# Patient Record
Sex: FEMALE | Race: BLACK OR AFRICAN AMERICAN | NOT HISPANIC OR LATINO | Employment: STUDENT | URBAN - METROPOLITAN AREA
[De-identification: names, ages, dates, MRNs, and addresses within clinical notes are randomized per-mention and may not be internally consistent; named-entity substitution may affect disease eponyms.]

---
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
## 2025-04-22 ENCOUNTER — OFFICE VISIT (OUTPATIENT)
Dept: OBSTETRICS AND GYNECOLOGY | Facility: CLINIC | Age: 20
End: 2025-04-22
Payer: MEDICAID

## 2025-04-22 VITALS
HEIGHT: 66 IN | WEIGHT: 147.69 LBS | BODY MASS INDEX: 23.74 KG/M2 | SYSTOLIC BLOOD PRESSURE: 103 MMHG | DIASTOLIC BLOOD PRESSURE: 78 MMHG

## 2025-04-22 DIAGNOSIS — N83.209 RUPTURE OF OVARIAN CYST: Primary | ICD-10-CM

## 2025-04-22 PROCEDURE — 3078F DIAST BP <80 MM HG: CPT | Mod: CPTII,,, | Performed by: STUDENT IN AN ORGANIZED HEALTH CARE EDUCATION/TRAINING PROGRAM

## 2025-04-22 PROCEDURE — 99213 OFFICE O/P EST LOW 20 MIN: CPT | Mod: PBBFAC | Performed by: STUDENT IN AN ORGANIZED HEALTH CARE EDUCATION/TRAINING PROGRAM

## 2025-04-22 PROCEDURE — 3074F SYST BP LT 130 MM HG: CPT | Mod: CPTII,,, | Performed by: STUDENT IN AN ORGANIZED HEALTH CARE EDUCATION/TRAINING PROGRAM

## 2025-04-22 PROCEDURE — 1159F MED LIST DOCD IN RCRD: CPT | Mod: CPTII,,, | Performed by: STUDENT IN AN ORGANIZED HEALTH CARE EDUCATION/TRAINING PROGRAM

## 2025-04-22 PROCEDURE — 3008F BODY MASS INDEX DOCD: CPT | Mod: CPTII,,, | Performed by: STUDENT IN AN ORGANIZED HEALTH CARE EDUCATION/TRAINING PROGRAM

## 2025-04-22 PROCEDURE — 99213 OFFICE O/P EST LOW 20 MIN: CPT | Mod: S$PBB,,, | Performed by: STUDENT IN AN ORGANIZED HEALTH CARE EDUCATION/TRAINING PROGRAM

## 2025-04-22 PROCEDURE — 99999 PR PBB SHADOW E&M-EST. PATIENT-LVL III: CPT | Mod: PBBFAC,,, | Performed by: STUDENT IN AN ORGANIZED HEALTH CARE EDUCATION/TRAINING PROGRAM

## 2025-04-22 NOTE — PROGRESS NOTES
Gynecology    SUBJECTIVE:     Chief Complaint: Follow-up       History of Present Illness:  Laina Dumont is a 18 yo G0 here today for ER follow up. She went to the ED on 04/07 with acute, severe pelvic pain. A CT A/P was done which demonstrated: 3.6 cm peripherally enhancing left adnexal cystic lesion suggestive of hemorrhagic corpus luteum associated with moderate amount of pelvic free fluid measuring simple fluid density that is above the volume suspected for physiologic pelvic free fluid. Cyst rupture cannot be excluded.  Her pain was controlled with one dose of toradol and she was discharged home in stable condition.    Today, she reports that she is doing well. She states she occasionally feels pelvic pain but it typically only lasts a few seconds before resolving. She has no other complaints.   She is currently using OCPs     Review of Systems:  Review of Systems   Constitutional:  Negative for chills and fever.   Respiratory:  Negative for shortness of breath.    Cardiovascular:  Negative for chest pain.   Gastrointestinal:  Negative for abdominal pain, nausea and vomiting.   Endocrine: Negative for hot flashes.   Genitourinary:  Negative for menstrual problem.   Integumentary:  Negative for breast mass, nipple discharge and breast skin changes.   Neurological:  Negative for headaches.   Hematological:  Does not bruise/bleed easily.   Psychiatric/Behavioral:  Negative for depression.    Breast: Negative for mass, mastodynia, nipple discharge and skin changes       OBJECTIVE:     Physical Exam:  Physical Exam  Constitutional:       Appearance: Normal appearance.   HENT:      Head: Normocephalic and atraumatic.   Eyes:      Conjunctiva/sclera: Conjunctivae normal.      Pupils: Pupils are equal, round, and reactive to light.   Cardiovascular:      Rate and Rhythm: Normal rate and regular rhythm.   Pulmonary:      Effort: Pulmonary effort is normal. No respiratory distress.   Abdominal:      General: Abdomen is  flat. There is no distension.      Palpations: Abdomen is soft.      Tenderness: There is no abdominal tenderness.   Genitourinary:     Comments: declines  Musculoskeletal:         General: Normal range of motion.      Cervical back: Normal range of motion.   Neurological:      Mental Status: She is alert.   Psychiatric:         Mood and Affect: Mood normal.         Thought Content: Thought content normal.       ASSESSMENT:       ICD-10-CM ICD-9-CM    1. Rupture of ovarian cyst  N83.209 620.2 US Pelvis Comp with Transvag NON-OB (xpd             Plan:      Laina was seen today for follow-up.    Diagnoses and all orders for this visit:    Rupture of ovarian cyst  -    Will repeat imaging with ultrasound   -    Continue OCP use. Can transition to continuous use if imaging demonstrates ovarian cysts   -    US Pelvis Comp with Transvag NON-OB (xpd; Future        Orders Placed This Encounter   Procedures    US Pelvis Comp with Transvag NON-OB (xpd       Adama Hendrix    Answers submitted by the patient for this visit:  Gynecologic Exam Questionnaire  (Submitted on 4/22/2025)  Chief Complaint: Gynecologic exam  genital itching: No  genital lesions: No  genital odor: No  genital rash: No  missed menses: No  pelvic pain: Yes  vaginal bleeding: No  vaginal discharge: No  Chronicity: new  Onset: more than 1 month ago  Frequency: intermittently  Progression since onset: gradually improving  Pain severity: mild  Affected side: both  Pregnant now?: No  abdominal pain: No  anorexia: No  back pain: Yes  chills: No  constipation: Yes  diarrhea: Yes  discolored urine: No  dysuria: No  fever: No  flank pain: No  frequency: No  headaches: No  hematuria: No  nausea: No  painful intercourse: Yes  rash: No  urgency: Yes  vomiting: No  Vaginal bleeding: no bleeding  Passing clots?: No  Passing tissue?: No  treatments tried: acetaminophen  Improvement on treatment: significant  Sexual activity: sexually active  Partner with STD  symptoms: no  Birth control: oral contraceptives  Menstrual history: regular  STD: No  abdominal surgery: No   section: No  Ectopic pregnancy: No  Endometriosis: No  herpes simplex: No  gynecological surgery: No  menorrhagia: No  metrorrhagia: No  miscarriage: No  ovarian cysts: Yes  perineal abscess: No  PID: No  terminated pregnancy: No  vaginosis: No

## 2025-05-19 ENCOUNTER — OFFICE VISIT (OUTPATIENT)
Dept: PRIMARY CARE CLINIC | Facility: CLINIC | Age: 20
End: 2025-05-19
Payer: MEDICAID

## 2025-05-19 VITALS
HEIGHT: 66 IN | SYSTOLIC BLOOD PRESSURE: 130 MMHG | DIASTOLIC BLOOD PRESSURE: 80 MMHG | BODY MASS INDEX: 23.42 KG/M2 | WEIGHT: 145.75 LBS | RESPIRATION RATE: 16 BRPM | OXYGEN SATURATION: 98 % | HEART RATE: 74 BPM

## 2025-05-19 DIAGNOSIS — G89.29 CHRONIC MIDLINE LOW BACK PAIN WITH BILATERAL SCIATICA: ICD-10-CM

## 2025-05-19 DIAGNOSIS — F32.1 CURRENT MODERATE EPISODE OF MAJOR DEPRESSIVE DISORDER WITHOUT PRIOR EPISODE: ICD-10-CM

## 2025-05-19 DIAGNOSIS — M54.41 CHRONIC MIDLINE LOW BACK PAIN WITH BILATERAL SCIATICA: ICD-10-CM

## 2025-05-19 DIAGNOSIS — M79.10 MYALGIA: ICD-10-CM

## 2025-05-19 DIAGNOSIS — Z00.00 WELL ADULT EXAM: Primary | ICD-10-CM

## 2025-05-19 DIAGNOSIS — G89.29 CHRONIC MIDLINE THORACIC BACK PAIN: ICD-10-CM

## 2025-05-19 DIAGNOSIS — M54.42 CHRONIC MIDLINE LOW BACK PAIN WITH BILATERAL SCIATICA: ICD-10-CM

## 2025-05-19 DIAGNOSIS — M54.6 CHRONIC MIDLINE THORACIC BACK PAIN: ICD-10-CM

## 2025-05-19 DIAGNOSIS — Z11.3 SCREEN FOR STD (SEXUALLY TRANSMITTED DISEASE): ICD-10-CM

## 2025-05-19 PROCEDURE — 1160F RVW MEDS BY RX/DR IN RCRD: CPT | Mod: CPTII,,, | Performed by: FAMILY MEDICINE

## 2025-05-19 PROCEDURE — 3075F SYST BP GE 130 - 139MM HG: CPT | Mod: CPTII,,, | Performed by: FAMILY MEDICINE

## 2025-05-19 PROCEDURE — 99385 PREV VISIT NEW AGE 18-39: CPT | Mod: S$PBB,1E,GY, | Performed by: FAMILY MEDICINE

## 2025-05-19 PROCEDURE — 99214 OFFICE O/P EST MOD 30 MIN: CPT | Mod: PBBFAC,PN | Performed by: FAMILY MEDICINE

## 2025-05-19 PROCEDURE — 1159F MED LIST DOCD IN RCRD: CPT | Mod: CPTII,,, | Performed by: FAMILY MEDICINE

## 2025-05-19 PROCEDURE — 3079F DIAST BP 80-89 MM HG: CPT | Mod: CPTII,,, | Performed by: FAMILY MEDICINE

## 2025-05-19 PROCEDURE — 3008F BODY MASS INDEX DOCD: CPT | Mod: CPTII,,, | Performed by: FAMILY MEDICINE

## 2025-05-19 PROCEDURE — 99999 PR PBB SHADOW E&M-EST. PATIENT-LVL IV: CPT | Mod: PBBFAC,,, | Performed by: FAMILY MEDICINE

## 2025-05-19 PROCEDURE — 99213 OFFICE O/P EST LOW 20 MIN: CPT | Mod: 25,S$PBB,, | Performed by: FAMILY MEDICINE

## 2025-05-19 NOTE — PROGRESS NOTES
Subjective:       Patient ID: Laina Dumont is a 19 y.o. female.    Chief Complaint: Establish Care    History of Present Illness    CHIEF COMPLAINT:  Patient presents today for regular primary checkup and back pain.    BACK PAIN:  She reports chronic back pain since high school, initially starting during powerlifting. She experiences two distinct types of pain: upper back pain at the bra line described as pinching sensation, aggravated by prolonged bra wear and lasting about an hour; and lower back pain with prolonged standing that radiates down both legs, which is more bothersome and persists throughout the day once triggered. No back imaging has been performed.    CHRONIC LEG PAIN:  She reports leg pain since childhood, initially diagnosed as growing pains that were expected to resolve by age 7. Pain occurs 1-2 times weekly, characterized by muscle tightening and spasms, affecting either the entire leg, just the thigh, one leg, or both legs. She uses ibuprofen for relief.    MEDICAL HISTORY:  She has a recent history of an ovarian cyst. During freshman year of high school, she had severe stomach issues requiring multiple hospitalizations, though no definitive diagnosis was established despite considerations of constipation and gallstones. In sophomore year, she was diagnosed with vitamin B deficiency and sustained a bruised rib.    MENTAL HEALTH:  She reports intermittent depression symptoms since sophomore year of high school, including feeling unlike herself and inability to experience positive emotions despite positive circumstances. She denies suicidal thoughts.    SOCIAL HISTORY:  She is a college student at [employer] and works as a . She exercises twice weekly, performing cardio activities including running and stair master. She reports occasional alcohol consumption of 1-2 drinks and weekend marijuana use. She denies vaping or cigarette use.      ROS:  Cardiovascular: +orthostatic  symptoms  Musculoskeletal: +back pain, +shooting pain sensation, +nerve pain, +muscle cramps          Review of patient's allergies indicates:  No Known Allergies    Current Medications[1]    History reviewed. No pertinent past medical history. History reviewed. No pertinent surgical history.   Social History     Socioeconomic History    Marital status: Single   Tobacco Use    Smoking status: Never     Passive exposure: Never    Smokeless tobacco: Never   Substance and Sexual Activity    Alcohol use: No    Drug use: No    Sexual activity: Never   Social History Narrative    Lives at home with 2 sisters, mom and step dad.    1 dog    No smokers    11th grade at [employer] (2022-23 school year)     Social Drivers of Health     Financial Resource Strain: Low Risk  (4/22/2025)    Overall Financial Resource Strain (CARDIA)     Difficulty of Paying Living Expenses: Not very hard   Food Insecurity: No Food Insecurity (4/22/2025)    Hunger Vital Sign     Worried About Running Out of Food in the Last Year: Never true     Ran Out of Food in the Last Year: Never true   Transportation Needs: No Transportation Needs (4/22/2025)    PRAPARE - Transportation     Lack of Transportation (Medical): No     Lack of Transportation (Non-Medical): No   Physical Activity: Sufficiently Active (4/22/2025)    Exercise Vital Sign     Days of Exercise per Week: 5 days     Minutes of Exercise per Session: 60 min   Stress: No Stress Concern Present (4/22/2025)    [place] [employer] - Occupational Stress Questionnaire     Feeling of Stress : Only a little   Housing Stability: Low Risk  (4/22/2025)    Housing Stability Vital Sign     Unable to Pay for Housing in the Last Year: No     Number of Times Moved in the Last Year: 0     Homeless in the Last Year: No      Family History   Problem Relation Name Age of Onset    Thyroid disease Maternal Grandmother      Hypertension Maternal Grandfather      Stroke Neg Hx       Strabismus Neg Hx      Retinal detachment Neg Hx      Macular degeneration Neg Hx      Glaucoma Neg Hx      Diabetes Neg Hx      Blindness Neg Hx      Amblyopia Neg Hx      Arrhythmia Neg Hx      Cardiomyopathy Neg Hx      Congenital heart disease Neg Hx      Early death Neg Hx      Heart attacks under age 50 Neg Hx      Pacemaker/defibrilator Neg Hx       Review of Systems    Objective:      Physical Exam    General: No acute distress. Well-developed. Well-nourished.  Eyes: EOMI. Sclerae anicteric.  HENT: Normocephalic. Atraumatic. Nares patent. Moist oral mucosa.  Cardiovascular: Regular rate. Regular rhythm. No murmurs. No rubs. No gallops. Normal S1, S2.  Respiratory: Normal respiratory effort. Clear to auscultation bilaterally. No rales. No rhonchi. No wheezing.  Musculoskeletal: No  obvious deformity.  No scoliosis.  No tenderness of the lower thoracic or lumbar spine  Extremities: No lower extremity edema.  Neurological: Alert & oriented x3. No slurred speech. Normal gait.  Psychiatric: Normal mood. Normal affect. Good insight. Good judgment.  Skin: Warm. Dry.  Areas of hyperpigmentation noted on the trunk         Assessment:       1. Well adult exam    2. Screen for STD (sexually transmitted disease)    3. Current moderate episode of major depressive disorder without prior episode    4. Chronic midline thoracic back pain    5. Chronic midline low back pain with bilateral sciatica    6. Myalgia        Plan:         Laina was seen today for establish care.    Diagnoses and all orders for this visit:    Well adult exam  Care gaps address.  Encouraged exercise  -     Hepatitis C Antibody; Future  -     HIV 1/2 Ag/Ab (4th Gen); Future    Screen for STD (sexually transmitted disease)  -     Treponema Pallidium Antibodies IgG, IgM; Future    Current moderate episode of major depressive disorder without prior episode  Patient reports depressed mood since summer urine high school.  She is interested in counseling.   Denies suicide ideation.  -     Ambulatory referral/consult to Primary Care Behavioral Health (Non-Opioids); Future    Chronic midline thoracic back pain  Patient reports onset of back pain following power weight lifting high school  -     X-Ray Thoracic Spine AP Lateral; Future    Chronic midline low back pain with bilateral sciatica  -     X-Ray Lumbar Spine 5 View; Future    Myalgia  -     CK; Future  -     Magnesium; Future        Assessment & Plan    MEDICATIONS:  - Recommend treatment for apparent tinea versicolor with OTC antifungal shampoo (Selsun Blue) applied to affected areas of skin at night and wash off in the morning, continue for several weeks.    ORDERS:  - Ordered lumbosacral XR to evaluate for compression fracture given history of powerlifting and current lower back pain radiating to legs.  - Ordered labs including magnesium level to check muscle-related markers, given reported muscle cramps and leg pain.  - Ordered screening for sexually transmitted diseases including HIV, hepatitis, and syphilis.    REFERRALS:  - Referred to behavioral health for counseling.          Lola Cox MD        This note was generated with the assistance of ambient listening technology. Verbal consent was obtained by the patient and accompanying visitor(s) for the recording of patient appointment to facilitate this note. I attest to having reviewed and edited the generated note for accuracy, though some syntax or spelling errors may persist. Please contact the author of this note for any clarification.            [1]   Current Outpatient Medications:     norethindrone-ethinyl estradiol (NECON) 0.5-35 mg-mcg per tablet, Take 1 tablet by mouth once daily., Disp: 90 tablet, Rfl: 3

## 2025-05-20 ENCOUNTER — HOSPITAL ENCOUNTER (OUTPATIENT)
Dept: RADIOLOGY | Facility: HOSPITAL | Age: 20
Discharge: HOME | End: 2025-05-20
Attending: FAMILY MEDICINE
Payer: MEDICAID

## 2025-05-20 DIAGNOSIS — M54.42 CHRONIC MIDLINE LOW BACK PAIN WITH BILATERAL SCIATICA: ICD-10-CM

## 2025-05-20 DIAGNOSIS — M54.6 CHRONIC MIDLINE THORACIC BACK PAIN: ICD-10-CM

## 2025-05-20 DIAGNOSIS — M54.41 CHRONIC MIDLINE LOW BACK PAIN WITH BILATERAL SCIATICA: ICD-10-CM

## 2025-05-20 DIAGNOSIS — G89.29 CHRONIC MIDLINE THORACIC BACK PAIN: ICD-10-CM

## 2025-05-20 DIAGNOSIS — G89.29 CHRONIC MIDLINE LOW BACK PAIN WITH BILATERAL SCIATICA: ICD-10-CM

## 2025-05-20 PROCEDURE — 72070 X-RAY EXAM THORAC SPINE 2VWS: CPT | Mod: TC,FY,PN

## 2025-05-20 PROCEDURE — 72110 X-RAY EXAM L-2 SPINE 4/>VWS: CPT | Mod: TC,FY,PN

## 2025-05-20 PROCEDURE — 72070 X-RAY EXAM THORAC SPINE 2VWS: CPT | Mod: 26,,, | Performed by: RADIOLOGY

## 2025-05-20 PROCEDURE — 72110 X-RAY EXAM L-2 SPINE 4/>VWS: CPT | Mod: 26,,, | Performed by: RADIOLOGY

## 2025-05-26 ENCOUNTER — TELEPHONE (OUTPATIENT)
Dept: PRIMARY CARE CLINIC | Facility: CLINIC | Age: 20
End: 2025-05-26
Payer: MEDICAID

## 2025-05-26 ENCOUNTER — RESULTS FOLLOW-UP (OUTPATIENT)
Dept: PRIMARY CARE CLINIC | Facility: CLINIC | Age: 20
End: 2025-05-26

## 2025-05-26 DIAGNOSIS — M54.42 CHRONIC MIDLINE LOW BACK PAIN WITH BILATERAL SCIATICA: ICD-10-CM

## 2025-05-26 DIAGNOSIS — G89.29 CHRONIC MIDLINE THORACIC BACK PAIN: Primary | ICD-10-CM

## 2025-05-26 DIAGNOSIS — G89.29 CHRONIC MIDLINE LOW BACK PAIN WITH BILATERAL SCIATICA: ICD-10-CM

## 2025-05-26 DIAGNOSIS — M54.6 CHRONIC MIDLINE THORACIC BACK PAIN: Primary | ICD-10-CM

## 2025-05-26 DIAGNOSIS — M54.41 CHRONIC MIDLINE LOW BACK PAIN WITH BILATERAL SCIATICA: ICD-10-CM

## 2025-06-02 ENCOUNTER — RESULTS FOLLOW-UP (OUTPATIENT)
Dept: OBSTETRICS AND GYNECOLOGY | Facility: HOSPITAL | Age: 20
End: 2025-06-02

## 2025-06-02 ENCOUNTER — HOSPITAL ENCOUNTER (OUTPATIENT)
Dept: RADIOLOGY | Facility: HOSPITAL | Age: 20
Discharge: HOME | End: 2025-06-02
Attending: STUDENT IN AN ORGANIZED HEALTH CARE EDUCATION/TRAINING PROGRAM
Payer: MEDICAID

## 2025-06-02 DIAGNOSIS — N83.209 RUPTURE OF OVARIAN CYST: ICD-10-CM

## 2025-06-02 PROCEDURE — 76830 TRANSVAGINAL US NON-OB: CPT | Mod: 26,,, | Performed by: RADIOLOGY

## 2025-06-02 PROCEDURE — 76856 US EXAM PELVIC COMPLETE: CPT | Mod: 26,,, | Performed by: RADIOLOGY

## 2025-06-02 PROCEDURE — 76830 TRANSVAGINAL US NON-OB: CPT | Mod: TC

## 2025-06-16 ENCOUNTER — CLINICAL SUPPORT (OUTPATIENT)
Dept: REHABILITATION | Facility: HOSPITAL | Age: 20
End: 2025-06-16
Payer: MEDICAID

## 2025-06-16 DIAGNOSIS — R29.898 WEAKNESS OF BOTH HIPS: Primary | ICD-10-CM

## 2025-06-16 PROCEDURE — 97110 THERAPEUTIC EXERCISES: CPT

## 2025-06-16 PROCEDURE — 97161 PT EVAL LOW COMPLEX 20 MIN: CPT

## 2025-06-16 NOTE — PROGRESS NOTES
Outpatient Rehab    Physical Therapy Evaluation    Patient Name: Laina Dumont  MRN: [id number]  YOB: 2005  Encounter Date: 6/16/2025    Therapy Diagnosis:   Encounter Diagnosis   Name Primary?    Weakness of both hips Yes     Physician: Lola Cox MD    Physician Orders: Eval and Treat  Medical Diagnosis: Chronic midline thoracic back pain  Chronic midline low back pain with bilateral sciatica  Surgical Diagnosis: Not applicable for this Episode   Surgical Date: Not applicable for this Episode  Days Since Last Surgery: Not applicable for this Episode    Visit # / Visits Authorized:  1 / 1  Insurance Authorization Period: 5/26/2025 to 5/26/2026  Date of Evaluation: 6/16/2025  Plan of Care Certification: 6/16/2025 to 8/15/2025    Time In: 1405   Time Out: 1455  Total Time (in minutes): 50   Total Billable Time (in minutes): 50    Intake Outcome Measure for FOTO Survey    Therapist reviewed FOTO scores for Laina Dumont on 6/16/2025.   FOTO report - see Media section or FOTO account episode details.     Intake Score: 59%    Precautions:       Subjective   History of Present Illness  Laina is a 19 y.o. female who reports to physical therapy with a chief concern of low back pain and mid back pain. The pain initially started when she did powerlifting in high school. She reports the back pain is intermittent. She feels it most with prolonged sitting and standing. She occasionally has pain radiating down her legs.      Pain     Patient reports a current pain level of 0/10. Pain at best is reported as 0/10. Pain at worst is reported as 8/10.   Location: mid and low back  Pain Qualities: Aching  Pain-Aggravating Factors: Standing, Sitting       Past Medical History/Physical Systems Review:   Laina Dumont  has no past medical history on file.    Laina Dumont  has no past surgical history on file.    Laina has a current medication list which includes the following prescription(s): norethindrone-ethinyl  estradiol.    Review of patient's allergies indicates:  No Known Allergies     Objective      Posture: sitting: protracted shoulders, slouched posture     Lumbar Range of Motion:    % Limitation Pain   Flexion No loss           Extension No loss           Left Side Bending No loss         Right Side Bending No loss         Left rotation   No loss         Right Rotation   No loss            Hip Range of Motion:   Right  Left   Hip Extension 30 deg 30 deg   Hip External Rotation (hip flexed to 90) 50 deg 50 deg   Hip Internal Rotation (hip flexed to 90) 45 deg 45 deg   Hip Flexion 120 deg 120 deg     Lower Extremity Strength  Right LE  Left LE  Goal   Knee extension: 5/5 Knee extension: 5/5 5/5   Knee flexion: 5/5 Knee flexion: 5/5 5/5   Hip flexion: 4/5 Hip flexion: 4+/5 5/5   Hip extension:  4-/5 Hip extension: 4-/5 5/5   Hip abduction: 4-/5 Hip abduction: 4-/5 5/5   Hip IR: 4+/5 Hip IR: 4+/5 5/5   Hip ER: 4/5 Hip ER: 4/5 5/5     DTR:   Right Left Comment   Patellar (L3-4) 2+ 2+    Achilles (S1) 2+ 2+      Neuro Dynamic Testing:    Sciatic nerve:      SLR: R = Negative     L = Negative      Joint Mobility: hypomobile throughout mid thoracic spine with     Sensation: intact bilateral LE      Treatment:     Therapeutic Exercise for 10 minutes:    HEP instruction:  Single leg bridges  Sidelying hip abduction  Bird dog LE only  Wall slide + lift off    Time Entry(in minutes):  PT Evaluation (Low) Time Entry: 40  Therapeutic Exercise Time Entry: 10    Assessment & Plan   Assessment  Laina presents with a condition of Low complexity.           Functional Limitations: Sitting tolerance, Standing tolerance  Impairments: Impaired physical strength    Prognosis: Good  Assessment Details: Laina is a 19 y.o. female referred to outpatient Physical Therapy with a medical diagnosis of Chronic midline thoracic back pain, Chronic midline low back pain with bilateral sciatica. Upon physical assessment, patient demonstrates:  decreased thoracic range of motion, bilateral hip weakness, and poor posture. Patient will benefit from skilled outpatient Physical Therapy to address the deficits stated, provide patient education, and to maximize patient's quality of life.     Plan  From a physical therapy perspective, the patient would benefit from: Skilled Rehab Services    Planned therapy interventions include: Therapeutic exercise, Therapeutic activities, Neuromuscular re-education, and Manual therapy.    Planned modalities to include: Biofeedback, Cryotherapy (cold pack), Electrical stimulation - attended, Electrical stimulation - passive/unattended, and Thermotherapy (hot pack).        Visit Frequency: 1 times Per Week for 8 Weeks.       This plan was discussed with Patient.   Discussion participants: Agreed Upon Plan of Care         The patient's spiritual, cultural, and educational needs were considered, and the patient is agreeable to the plan of care and goals.         Goals:   Active       Long Term Goals       Long Term Goals       Start:  06/23/25    Expected End:  08/15/25       1.     Patient will demonstrate bilateral hip strength 5/5.  2.     Pain Rating at Worst: 1/10 to improve overall Quality of Life.    3.     Patient will meet predicted functional outcome (FOTO) score: 70% functional ability or greater to increase self-perceived functional ability.  4.     Patient will be independent with updated HEP at discharge.               Short Term Goals       Short Term Goals       Start:  06/23/25    Expected End:  07/18/25       1.     Patient will be independent with HEP to supplement therapy in return to maximal function.  2.     Pain rating at Worst: 4/10 or less for improved tolerance with prolonged standing.   3.     Patient will demonstrate bilateral hip strength at least 4+/5.                Mleania Fitzpatrick, PT

## 2025-06-23 ENCOUNTER — CLINICAL SUPPORT (OUTPATIENT)
Dept: REHABILITATION | Facility: HOSPITAL | Age: 20
End: 2025-06-23
Payer: MEDICAID

## 2025-06-23 DIAGNOSIS — R29.898 WEAKNESS OF BOTH HIPS: Primary | ICD-10-CM

## 2025-06-23 PROCEDURE — 97110 THERAPEUTIC EXERCISES: CPT

## 2025-06-23 NOTE — PROGRESS NOTES
Outpatient Rehab    Physical Therapy Visit    Patient Name: Laina Dumont  MRN: [id number]  YOB: 2005  Encounter Date: 6/23/2025    Therapy Diagnosis:   Encounter Diagnosis   Name Primary?    Weakness of both hips Yes     Physician: Lola Cox MD    Physician Orders: Eval and Treat  Medical Diagnosis: Chronic midline thoracic back pain  Chronic midline low back pain with bilateral sciatica  Surgical Diagnosis: Not applicable for this Episode   Surgical Date: Not applicable for this Episode  Days Since Last Surgery: Not applicable for this Episode    Visit # / Visits Authorized:  1 / 10  Insurance Authorization Period: 6/16/2025 to 8/22/2025  Date of Evaluation: 6/16/2025  Plan of Care Certification: 6/16/2025 to 8/15/2025      PT/PTA:     Number of PTA visits since last PT visit:   Time In: 1300   Time Out: 1340  Total Time (in minutes): 40   Total Billable Time (in minutes): 40    FOTO:  Intake Score:  %  Survey Score 2:  %  Survey Score 3:  %    Precautions:       Subjective   Patient reports she wasn't feeling well over the weekend and did a lot of coughing which led to her chest feeling very sore. She also noticed bruising in her chest.         Objective        Treatment:     Therapeutic Exercise for 40 minutes:    Supine PPT with biofeedback cuff 40 mmHg to 50 mmHg 10 sec hold x 10  Supine PPT + BKFO with biofeedback cuff x 10 each  Bridges 3 x 8  Sidelying clamshells 3 x 8 bilateral   Supine SLR 2 x 10 bilateral  Sidelying hip abduction 2 x 10 bilateral     Time Entry(in minutes):  Therapeutic Exercise Time Entry: 40    Assessment & Plan   Assessment:     Patient arrived with reports of chest soreness/discomfort therefore shortened treatment session today. Treatment focused on core stabilization and bilateral hip strengthening. Will progress as tolerated     The patient will continue to benefit from skilled outpatient physical therapy in order to address the deficits listed in the problem  list on the initial evaluation, provide patient and family education, and maximize the patients level of independence in the home and community environments.     The patient's spiritual, cultural, and educational needs were considered, and the patient is agreeable to the plan of care and goals.         Plan:  Continue per established PT plan of care.     Goals:   Active       Long Term Goals       Long Term Goals       Start:  06/23/25    Expected End:  08/15/25       1.     Patient will demonstrate bilateral hip strength 5/5.  2.     Pain Rating at Worst: 1/10 to improve overall Quality of Life.    3.     Patient will meet predicted functional outcome (FOTO) score: 70% functional ability or greater to increase self-perceived functional ability.  4.     Patient will be independent with updated HEP at discharge.               Short Term Goals       Short Term Goals       Start:  06/23/25    Expected End:  07/18/25       1.     Patient will be independent with HEP to supplement therapy in return to maximal function.  2.     Pain rating at Worst: 4/10 or less for improved tolerance with prolonged standing.   3.     Patient will demonstrate bilateral hip strength at least 4+/5.                Melania Fitzpatrick, PT

## 2025-06-30 ENCOUNTER — CLINICAL SUPPORT (OUTPATIENT)
Dept: REHABILITATION | Facility: HOSPITAL | Age: 20
End: 2025-06-30
Payer: MEDICAID

## 2025-06-30 DIAGNOSIS — R29.898 WEAKNESS OF BOTH HIPS: Primary | ICD-10-CM

## 2025-06-30 PROCEDURE — 97110 THERAPEUTIC EXERCISES: CPT | Mod: CQ

## 2025-06-30 PROCEDURE — 97530 THERAPEUTIC ACTIVITIES: CPT | Mod: CQ

## 2025-06-30 NOTE — PROGRESS NOTES
Outpatient Rehab    Physical Therapy Visit    Patient Name: Laina Dumont  MRN: [id number]  YOB: 2005  Encounter Date: 6/30/2025    Therapy Diagnosis:   Encounter Diagnosis   Name Primary?    Weakness of both hips Yes     Physician: Lola Cox MD    Physician Orders: Eval and Treat  Medical Diagnosis: Chronic midline thoracic back pain  Chronic midline low back pain with bilateral sciatica  Surgical Diagnosis: Not applicable for this Episode   Surgical Date: Not applicable for this Episode  Days Since Last Surgery: Not applicable for this Episode    Visit # / Visits Authorized:  2 / 10  Insurance Authorization Period: 6/16/2025 to 8/22/2025  Date of Evaluation: 6/16/2025  Plan of Care Certification: 6/16/2025 to 8/15/2025      PT/PTA: PTA   Number of PTA visits since last PT visit:1    Time In: 1310   Time Out: 1400  Total Time (in minutes): 50   Total Billable Time (in minutes):   50    FOTO:  Intake Score:  %  Survey Score 2:  %  Survey Score 3:  %    Precautions:     Subjective   Patient reports some tightness throughout lumbar spine, however, patient feeling than last treatment session..  Pain reported as 2/10.      Objective    Objective Measures updated at progress report unless specified.      Treatment:   Therapeutic Exercise for 50 minutes:     Supine PPT with biofeedback cuff 40 mmHg to 50 mmHg 10 sec hold x 10  Supine PPT + BKFO with biofeedback cuff x 10 each  Bridges 3 x 8  Sidelying clamshells 3 x 8 bilateral   Supine SLR 2 x 10 bilateral  Sidelying hip abduction 2 x 10 bilateral   Sidelying open books x 15 each side 3 second holds   Seated internal rotation red thera band 2 x 10   Ball roll outs 2 x 10 3 second holds   Seated bilateral piriformis stretching     Therapeutic activity: 10 minutes     Sit to stands 2 x 10   Lateral walkouts red thera band 10 yards 3 laps      Time Entry(in minutes):  Therapeutic Exercise Time Entry: 40    Assessment & Plan   Assessment:   Patient  required verbal cues with sit to stands due to patient with no hip igne throughout task, however, after verbal cues to hinge at her hips patient was able to complete task properly. Patient with some increased discomfort throughout lumbar spine throughout therapeutic exercise with sit to stands and lateral walkouts. Patient was able to perform seated ball roll outs and seated piriformis stretch with decreased discomfort throughout lumbar. Physical Therapist Assistant instructed patient she can perform those two stretches and seated hamstring stretch at home to help decrease discomfort throughout lumbar spine outside of Physical Therapy treatment. Patient verbally agreed and understood.      The patient will continue to benefit from skilled outpatient physical therapy in order to address the deficits listed in the problem list on the initial evaluation, provide patient and family education, and maximize the patients level of independence in the home and community environments.     The patient's spiritual, cultural, and educational needs were considered, and the patient is agreeable to the plan of care and goals.       Plan:   Continue with Physical Therapist Plan of Care.     Goals:   Active       Long Term Goals       Long Term Goals       Start:  06/23/25    Expected End:  08/15/25       1.     Patient will demonstrate bilateral hip strength 5/5.  2.     Pain Rating at Worst: 1/10 to improve overall Quality of Life.    3.     Patient will meet predicted functional outcome (FOTO) score: 70% functional ability or greater to increase self-perceived functional ability.  4.     Patient will be independent with updated HEP at discharge.               Short Term Goals       Short Term Goals       Start:  06/23/25    Expected End:  07/18/25       1.     Patient will be independent with HEP to supplement therapy in return to maximal function.  2.     Pain rating at Worst: 4/10 or less for improved tolerance with prolonged  standing.   3.     Patient will demonstrate bilateral hip strength at least 4+/5.              Og Currie, PTA

## 2025-07-08 ENCOUNTER — PATIENT MESSAGE (OUTPATIENT)
Dept: PRIMARY CARE CLINIC | Facility: CLINIC | Age: 20
End: 2025-07-08
Payer: MEDICAID

## 2025-07-14 ENCOUNTER — CLINICAL SUPPORT (OUTPATIENT)
Dept: REHABILITATION | Facility: HOSPITAL | Age: 20
End: 2025-07-14
Attending: FAMILY MEDICINE
Payer: MEDICAID

## 2025-07-14 DIAGNOSIS — R29.898 WEAKNESS OF BOTH HIPS: Primary | ICD-10-CM

## 2025-07-14 PROCEDURE — 97530 THERAPEUTIC ACTIVITIES: CPT | Mod: CQ

## 2025-07-14 PROCEDURE — 97110 THERAPEUTIC EXERCISES: CPT | Mod: CQ

## 2025-07-14 NOTE — PROGRESS NOTES
Outpatient Rehab    Physical Therapy Visit    Patient Name: Laina Dumont  MRN: [id number]  YOB: 2005  Encounter Date: 7/14/2025    Therapy Diagnosis:   Encounter Diagnosis   Name Primary?    Weakness of both hips Yes     Physician: Lola Cox MD    Physician Orders: Eval and Treat  Medical Diagnosis: Chronic midline thoracic back pain  Chronic midline low back pain with bilateral sciatica  Surgical Diagnosis: Not applicable for this Episode   Surgical Date: Not applicable for this Episode  Days Since Last Surgery: Not applicable for this Episode    Visit # / Visits Authorized:  3 / 10  Insurance Authorization Period: 6/16/2025 to 8/22/2025  Date of Evaluation: 6/16/2025  Plan of Care Certification: 6/16/2025 to 8/15/2025      PT/PTA: PTA   Number of PTA visits since last PT visit:2    Time In: 1300   Time Out: 1400  Total Time (in minutes): 60   Total Billable Time (in minutes):   60    FOTO:  Intake Score:  %  Survey Score 2:  %  Survey Score 3:  %    Precautions:     Subjective   Patient reports having to cancel last treatment session due to pt over doing it at the gym and patient with increased pain, however, patient feeling much better today..  Pain reported as 0/10.      Objective    Objective Measures updated at progress report unless specified.      Treatment:   Therapeutic Exercise for 50 minutes:     Supine PPT with biofeedback cuff 40 mmHg to 50 mmHg 10 sec hold x 10  Supine PPT + BKFO with biofeedback cuff x 10 each  Bridges 3 x 8  Sidelying clamshells 3 x 8 bilateral   Supine SLR 2 x 10 bilateral  Sidelying hip abduction 2 x 10 bilateral   Sidelying open books x 15 each side 3 second holds   Seated internal rotation red thera band 2 x 10   Ball roll outs 2 x 10 3 second holds   Seated bilateral piriformis stretching     Therapeutic activity: 10 minutes     Sit to stands 2 x 10   Lateral walkouts red thera band 10 yards 3 laps      Time Entry(in minutes):  Therapeutic Exercise Time  Entry: 45  Therapeutic Activity: 15    Assessment & Plan   Assessment:   Patient with some discomfort throughout left knee throughout lateral walkouts, however, patient with decreased discomfort with slight knee flexion throughout task. Patient with good core activation and control throughout posterior pelvic tilt with biofeedback cuff. Will continue to progress patient as tolerated.      The patient will continue to benefit from skilled outpatient physical therapy in order to address the deficits listed in the problem list on the initial evaluation, provide patient and family education, and maximize the patients level of independence in the home and community environments.     The patient's spiritual, cultural, and educational needs were considered, and the patient is agreeable to the plan of care and goals.       Plan:   Continue with Physical Therapist Plan of Care.     Goals:   Active       Long Term Goals       Long Term Goals       Start:  06/23/25    Expected End:  08/15/25       1.     Patient will demonstrate bilateral hip strength 5/5.  2.     Pain Rating at Worst: 1/10 to improve overall Quality of Life.    3.     Patient will meet predicted functional outcome (FOTO) score: 70% functional ability or greater to increase self-perceived functional ability.  4.     Patient will be independent with updated HEP at discharge.               Short Term Goals       Short Term Goals       Start:  06/23/25    Expected End:  07/18/25       1.     Patient will be independent with HEP to supplement therapy in return to maximal function.  2.     Pain rating at Worst: 4/10 or less for improved tolerance with prolonged standing.   3.     Patient will demonstrate bilateral hip strength at least 4+/5.                Og Currie PTA

## 2025-08-01 ENCOUNTER — CLINICAL SUPPORT (OUTPATIENT)
Dept: REHABILITATION | Facility: HOSPITAL | Age: 20
End: 2025-08-01
Attending: FAMILY MEDICINE
Payer: MEDICAID

## 2025-08-01 DIAGNOSIS — R29.898 WEAKNESS OF BOTH HIPS: Primary | ICD-10-CM

## 2025-08-01 PROCEDURE — 97110 THERAPEUTIC EXERCISES: CPT

## 2025-08-01 NOTE — PROGRESS NOTES
Outpatient Rehab    Physical Therapy Progress Note    Patient Name: Laina Dumont  MRN: [id number]  YOB: 2005  Encounter Date: 8/1/2025    Therapy Diagnosis:   Encounter Diagnosis   Name Primary?    Weakness of both hips Yes     Physician: Lola Cox MD    Physician Orders: Eval and Treat  Medical Diagnosis: Chronic midline thoracic back pain  Chronic midline low back pain with bilateral sciatica  Surgical Diagnosis: Not applicable for this Episode   Surgical Date: Not applicable for this Episode  Days Since Last Surgery: Not applicable for this Episode    Visit # / Visits Authorized:  5 / 10  Insurance Authorization Period: 6/16/2025 to 8/22/2025  Date of Evaluation: 6/16/2025  Plan of Care Certification: 6/16/2025 to 8/15/2025      PT/PTA:     Number of PTA visits since last PT visit:   Time In: 1000   Time Out: 1050  Total Time (in minutes): 50   Total Billable Time (in minutes): 25    FOTO:  Intake Score (%): 59  Survey Score 2 (%): Not applicable for this Episode  Survey Score 3 (%): Not applicable for this Episode    Precautions:       Subjective   Patient reports she had to cancel her last 2 appointment due to getting called into work. She reports her low back is feeling better, but she still has mild pain..         Objective          Lower Extremity Strength  Right LE   Left LE   Goal   Knee extension: 5/5 Knee extension: 5/5 5/5   Knee flexion: 5/5 Knee flexion: 5/5 5/5   Hip flexion: 5/5 Hip flexion: 5/5 5/5   Hip extension:  4/5 Hip extension: 4/5 5/5   Hip abduction: 4+/5 Hip abduction: 4+/5 5/5   Hip IR: 4+/5 Hip IR: 4+/5 5/5   Hip ER: 4/5 Hip ER: 4/5 5/5      Treatment:     Therapeutic Exercise for 45 minutes    Re-assessment performed     Treadmill 5 minutes   Sidelying open books x 15 each side 3 second holds   Supine PPT with biofeedback cuff 40 mmHg to 50 mmHg 10 sec hold x 10  Supine PPT + BKFO with biofeedback cuff x 10 each  Bridges 3 x 10 red TB  Sidelying clamshells red  TB 3 x 8 bilateral   Sidelying hip abduction 2 x 10 bilateral   Shuttle leg press 50 lbs 3 x 10    Not performed:  Supine SLR 2 x 10 bilateral  Seated internal rotation red thera band 2 x 10   Ball roll outs 2 x 10 3 second holds   Seated bilateral piriformis stretching      Therapeutic activity: 10 minutes      Sit to stands green TB around knees 3 x 10   Lateral walkouts green thera band 10 yards 3 laps     Time Entry(in minutes):       Assessment & Plan   Assessment:     Patient arrives to PT with mild back pain. She reports symptoms have improved since starting PT. She demonstrates improvement in bilateral hip strength, but still has mild deficits in strength. Will progress treatment as tolerated.     The patient will continue to benefit from skilled outpatient physical therapy in order to address the deficits listed in the problem list on the initial evaluation, provide patient and family education, and maximize the patients level of independence in the home and community environments.     The patient's spiritual, cultural, and educational needs were considered, and the patient is agreeable to the plan of care and goals.         Plan:  Continue per established PT plan of care.       Goals:   Active       Long Term Goals       Long Term Goals       Start:  06/23/25    Expected End:  08/15/25       1.     Patient will demonstrate bilateral hip strength 5/5.  2.     Pain Rating at Worst: 1/10 to improve overall Quality of Life.    3.     Patient will meet predicted functional outcome (FOTO) score: 70% functional ability or greater to increase self-perceived functional ability.  4.     Patient will be independent with updated HEP at discharge.               Short Term Goals       Short Term Goals       Start:  06/23/25    Expected End:  07/18/25       1.     Patient will be independent with HEP to supplement therapy in return to maximal function.  2.     Pain rating at Worst: 4/10 or less for improved tolerance  with prolonged standing.   3.     Patient will demonstrate bilateral hip strength at least 4+/5.                Melania Fitzpatrick, PT

## 2025-08-04 ENCOUNTER — CLINICAL SUPPORT (OUTPATIENT)
Dept: REHABILITATION | Facility: HOSPITAL | Age: 20
End: 2025-08-04
Attending: FAMILY MEDICINE
Payer: MEDICAID

## 2025-08-04 DIAGNOSIS — R29.898 WEAKNESS OF BOTH HIPS: Primary | ICD-10-CM

## 2025-08-04 PROCEDURE — 97110 THERAPEUTIC EXERCISES: CPT

## 2025-08-04 NOTE — PROGRESS NOTES
Outpatient Rehab    Physical Therapy Progress Note    Patient Name: Laina Dumont  MRN: [id number]  YOB: 2005  Encounter Date: 8/4/2025    Therapy Diagnosis:   Encounter Diagnosis   Name Primary?    Weakness of both hips Yes     Physician: Lola Cox MD    Physician Orders: Eval and Treat  Medical Diagnosis: Chronic midline thoracic back pain  Chronic midline low back pain with bilateral sciatica  Surgical Diagnosis: Not applicable for this Episode   Surgical Date: Not applicable for this Episode  Days Since Last Surgery: Not applicable for this Episode    Visit # / Visits Authorized:  5 / 10  Insurance Authorization Period: 6/16/2025 to 8/22/2025  Date of Evaluation: 6/16/2025  Plan of Care Certification: 6/16/2025 to 8/15/2025      PT/PTA:     Number of PTA visits since last PT visit:   Time In: 0805   Time Out: 0855  Total Time (in minutes): 50   Total Billable Time (in minutes): 50    FOTO:  Intake Score (%): 59  Survey Score 2 (%): Not applicable for this Episode  Survey Score 3 (%): Not applicable for this Episode    Precautions:       Subjective   Patient reports she was off from work this weekend and was able to rest. She has no c/o back pain today, but states she has been feeling Right foot pain with walking..  Pain reported as 0/10.      Objective          Lower Extremity Strength  Right LE   Left LE   Goal   Knee extension: 5/5 Knee extension: 5/5 5/5   Knee flexion: 5/5 Knee flexion: 5/5 5/5   Hip flexion: 5/5 Hip flexion: 5/5 5/5   Hip extension:  4/5 Hip extension: 4/5 5/5   Hip abduction: 4+/5 Hip abduction: 4+/5 5/5   Hip IR: 4+/5 Hip IR: 4+/5 5/5   Hip ER: 4/5 Hip ER: 4/5 5/5        LE neural tension testing: negative       Treatment:     Therapeutic Exercise for 47 minutes    Treadmill 5 minutes   Sidelying open books x 15 each side 3 second holds NPT  Supine PPT with biofeedback cuff 40 mmHg to 50 mmHg 10 sec hold x 10  Supine PPT + BKFO with biofeedback cuff x 10  each  Bridges 3 x 10 green TB  Sidelying clamshells green TB 3 x 10 bilateral   Sidelying hip abduction 2 x 10 bilateral   Shuttle leg press 62 lbs 3 x 10  Towel curls 2 minutes  Seated heel raises 3 x 10  Standing heel raises with lacrosse ball 2 x 10  Single leg stance 30 sec x 3    Not performed:  Supine SLR 2 x 10 bilateral  Seated internal rotation red thera band 2 x 10   Ball roll outs 2 x 10 3 second holds   Seated bilateral piriformis stretching      Therapeutic activity: 8 minutes      Sit to stands green TB around knees 3 x 10   Lateral walkouts green thera band 10 yards 3 laps     Time Entry(in minutes):  Therapeutic Activity Time Entry: 8  Therapeutic Exercise Time Entry: 47    Assessment & Plan   Assessment:     Patient arrived without c/o back pain, but reports Right foot pain to plantar aspect of her foot. LE neural tension testing performed which was negative. She demonstrated TTP to distal plantar fascia. Added towel curls, seated heel raises and standing heel raises to address foot symptoms with handout provided for HEP. Continued with core stability activity and bilateral hip strengthening which she tolerated well. She was progressed with increased resistance for bridges, clamshells, and shuttle leg press.     The patient will continue to benefit from skilled outpatient physical therapy in order to address the deficits listed in the problem list on the initial evaluation, provide patient and family education, and maximize the patients level of independence in the home and community environments.     The patient's spiritual, cultural, and educational needs were considered, and the patient is agreeable to the plan of care and goals.       Plan:  Continue per established PT plan of care.     Goals:   Active       Long Term Goals       Long Term Goals       Start:  06/23/25    Expected End:  08/15/25       1.     Patient will demonstrate bilateral hip strength 5/5.  2.     Pain Rating at Worst: 1/10  to improve overall Quality of Life.    3.     Patient will meet predicted functional outcome (FOTO) score: 70% functional ability or greater to increase self-perceived functional ability.  4.     Patient will be independent with updated HEP at discharge.               Short Term Goals       Short Term Goals       Start:  06/23/25    Expected End:  07/18/25       1.     Patient will be independent with HEP to supplement therapy in return to maximal function.  2.     Pain rating at Worst: 4/10 or less for improved tolerance with prolonged standing.   3.     Patient will demonstrate bilateral hip strength at least 4+/5.                Melania Fitzpatrick, PT

## 2025-08-11 ENCOUNTER — CLINICAL SUPPORT (OUTPATIENT)
Dept: REHABILITATION | Facility: HOSPITAL | Age: 20
End: 2025-08-11
Attending: FAMILY MEDICINE
Payer: MEDICAID

## 2025-08-11 DIAGNOSIS — R29.898 WEAKNESS OF BOTH HIPS: Primary | ICD-10-CM

## 2025-08-11 PROCEDURE — 97530 THERAPEUTIC ACTIVITIES: CPT | Mod: CQ

## 2025-08-11 PROCEDURE — 97110 THERAPEUTIC EXERCISES: CPT | Mod: CQ
